# Patient Record
Sex: MALE | Race: WHITE | HISPANIC OR LATINO | URBAN - METROPOLITAN AREA
[De-identification: names, ages, dates, MRNs, and addresses within clinical notes are randomized per-mention and may not be internally consistent; named-entity substitution may affect disease eponyms.]

---

## 2017-01-06 ENCOUNTER — EMERGENCY (EMERGENCY)
Facility: HOSPITAL | Age: 57
LOS: 1 days | Discharge: HOME | End: 2017-01-06
Attending: EMERGENCY MEDICINE | Admitting: EMERGENCY MEDICINE
Payer: MEDICAID

## 2017-01-06 VITALS
TEMPERATURE: 98 F | DIASTOLIC BLOOD PRESSURE: 98 MMHG | OXYGEN SATURATION: 98 % | SYSTOLIC BLOOD PRESSURE: 164 MMHG | RESPIRATION RATE: 16 BRPM | HEART RATE: 86 BPM

## 2017-01-06 DIAGNOSIS — R20.0 ANESTHESIA OF SKIN: ICD-10-CM

## 2017-01-06 LAB
ALBUMIN SERPL ELPH-MCNC: 4.6 G/DL — SIGNIFICANT CHANGE UP (ref 3.3–5)
ALP SERPL-CCNC: 92 U/L — SIGNIFICANT CHANGE UP (ref 40–120)
ALT FLD-CCNC: 16 U/L RC — SIGNIFICANT CHANGE UP (ref 10–45)
ANION GAP SERPL CALC-SCNC: 15 MMOL/L — SIGNIFICANT CHANGE UP (ref 5–17)
APPEARANCE CSF: CLEAR — SIGNIFICANT CHANGE UP
APPEARANCE SPUN FLD: COLORLESS — SIGNIFICANT CHANGE UP
APPEARANCE UR: CLEAR — SIGNIFICANT CHANGE UP
APTT BLD: 30.5 SEC — SIGNIFICANT CHANGE UP (ref 27.5–37.4)
AST SERPL-CCNC: 19 U/L — SIGNIFICANT CHANGE UP (ref 10–40)
BASOPHILS # BLD AUTO: 0 K/UL — SIGNIFICANT CHANGE UP (ref 0–0.2)
BASOPHILS NFR BLD AUTO: 0.8 % — SIGNIFICANT CHANGE UP (ref 0–2)
BILIRUB SERPL-MCNC: 0.7 MG/DL — SIGNIFICANT CHANGE UP (ref 0.2–1.2)
BILIRUB UR-MCNC: NEGATIVE — SIGNIFICANT CHANGE UP
BLD GP AB SCN SERPL QL: NEGATIVE — SIGNIFICANT CHANGE UP
BUN SERPL-MCNC: 8 MG/DL — SIGNIFICANT CHANGE UP (ref 7–23)
CALCIUM SERPL-MCNC: 9.6 MG/DL — SIGNIFICANT CHANGE UP (ref 8.4–10.5)
CHLORIDE SERPL-SCNC: 106 MMOL/L — SIGNIFICANT CHANGE UP (ref 96–108)
CO2 SERPL-SCNC: 23 MMOL/L — SIGNIFICANT CHANGE UP (ref 22–31)
COLOR CSF: SIGNIFICANT CHANGE UP
COLOR SPEC: SIGNIFICANT CHANGE UP
CREAT SERPL-MCNC: 0.73 MG/DL — SIGNIFICANT CHANGE UP (ref 0.5–1.3)
DIFF PNL FLD: NEGATIVE — SIGNIFICANT CHANGE UP
EOSINOPHIL # BLD AUTO: 0 K/UL — SIGNIFICANT CHANGE UP (ref 0–0.5)
EOSINOPHIL NFR BLD AUTO: 0.7 % — SIGNIFICANT CHANGE UP (ref 0–6)
EPI CELLS # UR: SIGNIFICANT CHANGE UP /HPF
GLUCOSE CSF-MCNC: 57 MG/DL — SIGNIFICANT CHANGE UP (ref 40–70)
GLUCOSE SERPL-MCNC: 105 MG/DL — HIGH (ref 70–99)
GLUCOSE UR QL: NEGATIVE — SIGNIFICANT CHANGE UP
GRAM STN FLD: SIGNIFICANT CHANGE UP
HCT VFR BLD CALC: 37.1 % — LOW (ref 39–50)
HGB BLD-MCNC: 13.2 G/DL — SIGNIFICANT CHANGE UP (ref 13–17)
INR BLD: 1.15 RATIO — SIGNIFICANT CHANGE UP (ref 0.88–1.16)
KETONES UR-MCNC: NEGATIVE — SIGNIFICANT CHANGE UP
LEUKOCYTE ESTERASE UR-ACNC: NEGATIVE — SIGNIFICANT CHANGE UP
LYMPHOCYTES # BLD AUTO: 1.2 K/UL — SIGNIFICANT CHANGE UP (ref 1–3.3)
LYMPHOCYTES # BLD AUTO: 24.3 % — SIGNIFICANT CHANGE UP (ref 13–44)
MCHC RBC-ENTMCNC: 31.3 PG — SIGNIFICANT CHANGE UP (ref 27–34)
MCHC RBC-ENTMCNC: 35.4 GM/DL — SIGNIFICANT CHANGE UP (ref 32–36)
MCV RBC AUTO: 88.2 FL — SIGNIFICANT CHANGE UP (ref 80–100)
MONOCYTES # BLD AUTO: 0.4 K/UL — SIGNIFICANT CHANGE UP (ref 0–0.9)
MONOCYTES NFR BLD AUTO: 7.9 % — SIGNIFICANT CHANGE UP (ref 2–14)
NEUTROPHILS # BLD AUTO: 3.2 K/UL — SIGNIFICANT CHANGE UP (ref 1.8–7.4)
NEUTROPHILS # CSF: SIGNIFICANT CHANGE UP (ref 0–6)
NEUTROPHILS NFR BLD AUTO: 66.3 % — SIGNIFICANT CHANGE UP (ref 43–77)
NITRITE UR-MCNC: NEGATIVE — SIGNIFICANT CHANGE UP
NRBC NFR CSF: <1 — SIGNIFICANT CHANGE UP (ref 0–5)
PH UR: 7 — SIGNIFICANT CHANGE UP (ref 4.8–8)
PLATELET # BLD AUTO: 141 K/UL — LOW (ref 150–400)
POTASSIUM SERPL-MCNC: 3.5 MMOL/L — SIGNIFICANT CHANGE UP (ref 3.5–5.3)
POTASSIUM SERPL-SCNC: 3.5 MMOL/L — SIGNIFICANT CHANGE UP (ref 3.5–5.3)
PROT CSF-MCNC: 47 MG/DL — HIGH (ref 15–45)
PROT SERPL-MCNC: 7.6 G/DL — SIGNIFICANT CHANGE UP (ref 6–8.3)
PROT UR-MCNC: NEGATIVE — SIGNIFICANT CHANGE UP
PROTHROM AB SERPL-ACNC: 12.6 SEC — SIGNIFICANT CHANGE UP (ref 10–13.1)
RBC # BLD: 4.21 M/UL — SIGNIFICANT CHANGE UP (ref 4.2–5.8)
RBC # CSF: 1 /UL — HIGH (ref 0–0)
RBC # FLD: 12.2 % — SIGNIFICANT CHANGE UP (ref 10.3–14.5)
RBC CASTS # UR COMP ASSIST: SIGNIFICANT CHANGE UP /HPF (ref 0–2)
RH IG SCN BLD-IMP: POSITIVE — SIGNIFICANT CHANGE UP
SODIUM SERPL-SCNC: 144 MMOL/L — SIGNIFICANT CHANGE UP (ref 135–145)
SP GR SPEC: 1.01 — SIGNIFICANT CHANGE UP (ref 1.01–1.02)
SPECIMEN SOURCE: SIGNIFICANT CHANGE UP
TUBE TYPE: SIGNIFICANT CHANGE UP
UROBILINOGEN FLD QL: NEGATIVE — SIGNIFICANT CHANGE UP
WBC # BLD: 4.8 K/UL — SIGNIFICANT CHANGE UP (ref 3.8–10.5)
WBC # FLD AUTO: 4.8 K/UL — SIGNIFICANT CHANGE UP (ref 3.8–10.5)
WBC UR QL: SIGNIFICANT CHANGE UP /HPF (ref 0–5)

## 2017-01-06 PROCEDURE — 93010 ELECTROCARDIOGRAM REPORT: CPT | Mod: 59

## 2017-01-06 PROCEDURE — 70498 CT ANGIOGRAPHY NECK: CPT | Mod: 26

## 2017-01-06 PROCEDURE — 71010: CPT | Mod: 26

## 2017-01-06 PROCEDURE — 99220: CPT | Mod: 25

## 2017-01-06 PROCEDURE — 70496 CT ANGIOGRAPHY HEAD: CPT | Mod: 26

## 2017-01-06 PROCEDURE — 62270 DX LMBR SPI PNXR: CPT

## 2017-01-06 RX ORDER — ASPIRIN/CALCIUM CARB/MAGNESIUM 324 MG
1 TABLET ORAL
Qty: 0 | Refills: 0 | COMMUNITY

## 2017-01-06 RX ORDER — ATORVASTATIN CALCIUM 80 MG/1
1 TABLET, FILM COATED ORAL
Qty: 0 | Refills: 0 | COMMUNITY

## 2017-01-06 RX ORDER — ATORVASTATIN CALCIUM 80 MG/1
20 TABLET, FILM COATED ORAL AT BEDTIME
Qty: 0 | Refills: 0 | Status: DISCONTINUED | OUTPATIENT
Start: 2017-01-06 | End: 2017-01-10

## 2017-01-06 RX ORDER — AMLODIPINE BESYLATE 2.5 MG/1
10 TABLET ORAL DAILY
Qty: 0 | Refills: 0 | Status: DISCONTINUED | OUTPATIENT
Start: 2017-01-06 | End: 2017-01-10

## 2017-01-06 RX ORDER — ASPIRIN/CALCIUM CARB/MAGNESIUM 324 MG
81 TABLET ORAL DAILY
Qty: 0 | Refills: 0 | Status: DISCONTINUED | OUTPATIENT
Start: 2017-01-06 | End: 2017-01-10

## 2017-01-06 RX ORDER — METOPROLOL TARTRATE 50 MG
25 TABLET ORAL ONCE
Qty: 0 | Refills: 0 | Status: COMPLETED | OUTPATIENT
Start: 2017-01-06 | End: 2017-01-06

## 2017-01-06 RX ORDER — CLOPIDOGREL BISULFATE 75 MG/1
1 TABLET, FILM COATED ORAL
Qty: 0 | Refills: 0 | COMMUNITY

## 2017-01-06 RX ORDER — NEBIVOLOL HYDROCHLORIDE 5 MG/1
1 TABLET ORAL
Qty: 0 | Refills: 0 | COMMUNITY

## 2017-01-06 RX ORDER — LISINOPRIL 2.5 MG/1
1 TABLET ORAL
Qty: 0 | Refills: 0 | COMMUNITY

## 2017-01-06 RX ORDER — LISINOPRIL 2.5 MG/1
40 TABLET ORAL DAILY
Qty: 0 | Refills: 0 | Status: DISCONTINUED | OUTPATIENT
Start: 2017-01-06 | End: 2017-01-10

## 2017-01-06 RX ORDER — SODIUM CHLORIDE 9 MG/ML
1000 INJECTION INTRAMUSCULAR; INTRAVENOUS; SUBCUTANEOUS ONCE
Qty: 0 | Refills: 0 | Status: COMPLETED | OUTPATIENT
Start: 2017-01-06 | End: 2017-01-06

## 2017-01-06 RX ORDER — CLOPIDOGREL BISULFATE 75 MG/1
75 TABLET, FILM COATED ORAL DAILY
Qty: 0 | Refills: 0 | Status: DISCONTINUED | OUTPATIENT
Start: 2017-01-06 | End: 2017-01-10

## 2017-01-06 RX ORDER — AMLODIPINE BESYLATE 2.5 MG/1
1 TABLET ORAL
Qty: 0 | Refills: 0 | COMMUNITY

## 2017-01-06 RX ADMIN — Medication 25 MILLIGRAM(S): at 21:46

## 2017-01-06 RX ADMIN — SODIUM CHLORIDE 1000 MILLILITER(S): 9 INJECTION INTRAMUSCULAR; INTRAVENOUS; SUBCUTANEOUS at 20:04

## 2017-01-06 RX ADMIN — ATORVASTATIN CALCIUM 20 MILLIGRAM(S): 80 TABLET, FILM COATED ORAL at 21:46

## 2017-01-06 NOTE — ED ADULT NURSE NOTE - CHPI ED SYMPTOMS NEG
no weakness/no vomiting/no confusion/no dizziness/no fever/no diaphoresis/no blurred vision/no loss of consciousness/no lethargy/no urinary frequency/no nausea

## 2017-01-06 NOTE — ED CDU PROVIDER NOTE - MEDICAL DECISION MAKING DETAILS
see hpi ATTD: facial numbness with new aneurysm on ct outpt. repeat ct head done with right MCA aneurysm. will transfer to obs for neurovascular checks, MRI and neuro to follow.

## 2017-01-06 NOTE — ED CDU PROVIDER NOTE - PROGRESS NOTE DETAILS
Pt resting comfortable. No complaints. VSS. Will continue to observe and reassess in am -PA Juan Diego Kasper Pt sleeping. NAD. No complaints. VSS. Continue to monitor. -RASHID Kasper CDU NOTE RASHID MADSEN: Pt resting comfortably, feeling well without complaint. NAD, VSS. No events on telemetry. CV: S1S2 RR, Lungs CTA B/L, neuro exam intact no deficits. pt to get MRI brain today. CDU NOTE RASHID MADSEN: Pt resting comfortably, feeling well without complaint. NAD, VSS. No events on telemetry. spoke with neuro who gave verbal read on MRI - no new infarcts; pt stable for d/c. spoke with neurosx who prefer to wait for final read then will re-eval. called neuro radiologist for read, will give read shortly. CDU NOTE RASHID MADSEN: Pt resting comfortably, feeling well without complaint. NAD, VSS. No events on telemetry. spoke with neuro who gave verbal read on MRI - no new infarcts; pt stable for d/c with outpt f/u. per neuro no gabapentin now, can be started by outpt neuro if needed. spoke with neurosx who prefer to wait for final read then will re-eval. called neuro radiologist for read, will give read shortly. CDU NOTE RASHID MADSEN: case and plan discussed with Dr. ERIN Maravilla; pt stable for discharge. Taiwo: The patient has been seen and evaluated and reports improvement in symptoms.  The patient is stable for discharge home and will follow up with PMD.

## 2017-01-06 NOTE — ED PROVIDER NOTE - ATTENDING CONTRIBUTION TO CARE
55 y/o m with pmhx HTN, ischemic stroke x 3 in Dec. 2014 with mild left hemiparesis residual deficits on plavix and aspirin s/p rehab complaining of abnormal CT scan.  Daughter with him at bedside states that he has increasing numbness on left side of face since stroke. has a copy of the CT ordered by Dr. 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 No new weakness or numbness. no urinary complaints. PMD Dr. Brisa Jung  Gen. no acute distress, Non toxic   HEENT: EOMI, mmm,   Lungs: CTAB/L no C/ W /R   CVS: S1S2   Abd; Soft non tender, non distended   Ext: no edema   Neuro AAOx3 non focal clear speech  babinksi down on right up on left side. muscle strength 5/5 upper and lower b/l.  able to puff out cheeks and symetric smile. diminished sens on left side of face.

## 2017-01-06 NOTE — ED CDU PROVIDER NOTE - CONDUCTED A DETAILED DISCUSSION WITH PATIENT AND/OR GUARDIAN REGARDING, MDM
radiology results/lab results radiology results/need for outpatient follow-up/lab results/return to ED if symptoms worsen, persist or questions arise

## 2017-01-06 NOTE — ED PROVIDER NOTE - OBJECTIVE STATEMENT
56y Male PMH ischemic stroke with mild left hemiparesis residual deficits on plavix and aspirin s/p rehab complaining of abnormal CT scan.  Felt the left side of his face is weaker, having occasional headaches, therefore had CT scan of his head. Never had surgery. No nausea, no pain anywhere. Patient is near sighted, uses glasses, but no changes.     Neurologist: Dr. Nimisha Nevarez (Neurologist)  PCP: Dr. Brisa Jung 56y Male PMH HTN, ischemic stroke with mild left hemiparesis residual deficits on plavix and aspirin s/p rehab complaining of abnormal CT scan.  Felt the left side of his face is weaker, having occasional headaches, therefore had CT scan of his head. Never had surgery. No nausea, no pain anywhere. Patient is near sighted, uses glasses, but no changes.     Neurologist: Dr. Nimisha Nevarez (Neurologist)  PCP: Dr. Brisa Jung 56y Male PMH HTN, ischemic stroke with mild left hemiparesis residual deficits on plavix and aspirin s/p rehab complaining of abnormal CT scan.  Felt the left side of his face is number / weaker, though persistent from stroke, having occasional headaches, therefore had CT Angio scan of his head by his new neurologist. Recent CT angio demonstrating 4.5mm aneurysm of head in right distal M1 middle cerebral arteries, severe stenosis of right distal vertebral artery. Never had surgery previously. No nausea, no pain anywhere. Patient is near sighted, uses glasses, but no changes.     Neurologist: Dr. Nimisha Nevarez (Neurologist)  PCP: Dr. Brisa Jung

## 2017-01-06 NOTE — ED CDU PROVIDER NOTE - OBJECTIVE STATEMENT
56yom pmhx of HTN HLD ischemic stroke with mild left hemiparesis residual deficits on plavix and aspirin s/p rehab complaining of abnormal CT scan. Pt started follow up with new neurologist and expressed symx of intermittent L sided facial numbness and headache for years therefore had CT scan of his head. No new symx. Never had surgery. No nausea, no pain anywhere. Patient is near sighted, uses glasses, but no changes. CT head and CTA with small aneurysm. In CDU for MRI for better eval of the aneurysm and NeuroSx re-eval.     Neurologist: Dr. Nimisha Nevarez (Neurologist)  PCP: Dr. Brisa Jung

## 2017-01-06 NOTE — ED PROVIDER NOTE - PROGRESS NOTE DETAILS
seen by neuro and neurosurgery ,LP done no xanthochromia ZR seen by neuro and neurosurgery ,LP done no xanthochromia pt has facial numbness for 4 y after his stroke follow up mri and neuro outpatient  ZR seen by neuro and neurosurgery ,LP done no xanthochromia pt has facial numbness for 4 y after his stroke  Neuro surgery wants to cdu pt ZR seen by neuro and neurosurgery ,LP done no xanthochromia pt has facial numbness for 4 y after his stroke  Neuro surgery wants to cdu pt with MRI tomorrow ZR

## 2017-01-06 NOTE — ED CDU PROVIDER NOTE - PLAN OF CARE
1. Follow up with your doctor/ Neurologist. And Neurosurgeon. Take copy of your results.   2. Rest, keep self well hydrated. Continue home medications as prescribed, aneurysm education packet was given to you for further information  3. Return for any weakness, numbness, change in speech or vision, problems walking or any other concerns 1. Follow up with your PMD, Neurologist ([phone]), and Neurosurgeon, Dr. Louie ([phone]) within 1 week. Take copy of your results.   2. Rest, keep self well hydrated. Continue home medications as prescribed, aneurysm education packet was given to you for further information  3. Return for any weakness, numbness, change in speech or vision, problems walking or any other concerns

## 2017-01-06 NOTE — ED ADULT NURSE NOTE - OBJECTIVE STATEMENT
57 y/o male aox3 c/o "headache x2 weeks on and off". Denies any pain at this time. No blurry vision, no nausea, vomiting or dizziness. Hx of stroke in the past with left sided residual weakness. No shortness of breath. Skin is warm dry pink intact. Vitals stable. pt is ambulatory with steady gait. IV in place no signs of infiltration.

## 2017-01-06 NOTE — ED CDU PROVIDER NOTE - ATTENDING CONTRIBUTION TO CARE
see hpi 57 y/o m with pmhx HTN, ischemic stroke x 3 in Dec. 2014 with mild left hemiparesis residual deficits on plavix and aspirin s/p rehab complaining of abnormal CT scan.  Daughter with him at bedside states that he has increasing numbness on left side of face since stroke. has a copy of the CT ordered by Dr. 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 No new weakness or numbness. no urinary complaints. PMD Dr. Brisa Jung  Gen. no acute distress, Non toxic   HEENT: EOMI, mmm,   Lungs: CTAB/L no C/ W /R   CVS: S1S2   Abd; Soft non tender, non distended   Ext: no edema   Neuro AAOx3 non focal clear speech  babinksi down on right up on left side. muscle strength 5/5 upper and lower b/l.  able to puff out cheeks and symetric smile. diminished sens on left side of face.

## 2017-01-07 VITALS
OXYGEN SATURATION: 97 % | TEMPERATURE: 98 F | SYSTOLIC BLOOD PRESSURE: 127 MMHG | DIASTOLIC BLOOD PRESSURE: 76 MMHG | RESPIRATION RATE: 18 BRPM | HEART RATE: 61 BPM

## 2017-01-07 PROCEDURE — 70553 MRI BRAIN STEM W/O & W/DYE: CPT | Mod: 26

## 2017-01-07 PROCEDURE — 71045 X-RAY EXAM CHEST 1 VIEW: CPT

## 2017-01-07 PROCEDURE — 80053 COMPREHEN METABOLIC PANEL: CPT

## 2017-01-07 PROCEDURE — 70553 MRI BRAIN STEM W/O & W/DYE: CPT

## 2017-01-07 PROCEDURE — 89051 BODY FLUID CELL COUNT: CPT

## 2017-01-07 PROCEDURE — 87070 CULTURE OTHR SPECIMN AEROBIC: CPT

## 2017-01-07 PROCEDURE — 85027 COMPLETE CBC AUTOMATED: CPT

## 2017-01-07 PROCEDURE — 84157 ASSAY OF PROTEIN OTHER: CPT

## 2017-01-07 PROCEDURE — 85610 PROTHROMBIN TIME: CPT

## 2017-01-07 PROCEDURE — G0378: CPT

## 2017-01-07 PROCEDURE — 82945 GLUCOSE OTHER FLUID: CPT

## 2017-01-07 PROCEDURE — A9585: CPT

## 2017-01-07 PROCEDURE — 70498 CT ANGIOGRAPHY NECK: CPT

## 2017-01-07 PROCEDURE — 99217: CPT

## 2017-01-07 PROCEDURE — 86900 BLOOD TYPING SEROLOGIC ABO: CPT

## 2017-01-07 PROCEDURE — 70496 CT ANGIOGRAPHY HEAD: CPT

## 2017-01-07 PROCEDURE — 86901 BLOOD TYPING SEROLOGIC RH(D): CPT

## 2017-01-07 PROCEDURE — 99285 EMERGENCY DEPT VISIT HI MDM: CPT | Mod: 25

## 2017-01-07 PROCEDURE — 86850 RBC ANTIBODY SCREEN: CPT

## 2017-01-07 PROCEDURE — 87205 SMEAR GRAM STAIN: CPT

## 2017-01-07 PROCEDURE — 85730 THROMBOPLASTIN TIME PARTIAL: CPT

## 2017-01-07 PROCEDURE — 99283 EMERGENCY DEPT VISIT LOW MDM: CPT

## 2017-01-07 PROCEDURE — 81001 URINALYSIS AUTO W/SCOPE: CPT

## 2017-01-07 PROCEDURE — 93005 ELECTROCARDIOGRAM TRACING: CPT

## 2017-01-07 PROCEDURE — 62270 DX LMBR SPI PNXR: CPT

## 2017-01-07 RX ADMIN — AMLODIPINE BESYLATE 10 MILLIGRAM(S): 2.5 TABLET ORAL at 06:08

## 2017-01-07 RX ADMIN — LISINOPRIL 40 MILLIGRAM(S): 2.5 TABLET ORAL at 06:08

## 2017-01-07 RX ADMIN — CLOPIDOGREL BISULFATE 75 MILLIGRAM(S): 75 TABLET, FILM COATED ORAL at 06:08

## 2017-01-07 RX ADMIN — Medication 81 MILLIGRAM(S): at 06:08

## 2017-01-07 NOTE — ED ADULT NURSE REASSESSMENT NOTE - NIH STROKE SCALE: 5A. MOTOR ARM, LEFT, QM
(0) No drift; limb holds 90 (or 45) degrees for full 10 secs

## 2017-01-07 NOTE — ED ADULT NURSE REASSESSMENT NOTE - NIH STROKE SCALE: 1B. LOC QUESTIONS, QM
(0) Answers both questions correctly

## 2017-01-07 NOTE — ED ADULT NURSE REASSESSMENT NOTE - NURSING NEURO ORIENTATION
oriented to person, place and time

## 2017-01-07 NOTE — ED ADULT NURSE REASSESSMENT NOTE - NIH STROKE SCALE: 5B. MOTOR ARM, RIGHT, QM
(0) No drift; limb holds 90 (or 45) degrees for full 10 secs
(0) No drift; limb holds 90 (or 45) degrees for full 10 secs

## 2017-01-07 NOTE — ED ADULT NURSE REASSESSMENT NOTE - NIH STROKE SCALE: 1C. LOC COMMANDS, QM
(0) Performs both tasks correctly

## 2017-01-07 NOTE — ED ADULT NURSE REASSESSMENT NOTE - NIH STROKE SCALE: 6A. MOTOR LEG, LEFT, QM
(0) No drift; leg holds 30 degree position for full 5 secs

## 2017-01-09 LAB
CULTURE RESULTS: NO GROWTH — SIGNIFICANT CHANGE UP
SPECIMEN SOURCE: SIGNIFICANT CHANGE UP

## 2018-09-05 PROBLEM — E78.00 PURE HYPERCHOLESTEROLEMIA, UNSPECIFIED: Chronic | Status: ACTIVE | Noted: 2017-01-06

## 2018-09-05 PROBLEM — Z00.00 ENCOUNTER FOR PREVENTIVE HEALTH EXAMINATION: Status: ACTIVE | Noted: 2018-09-05

## 2018-09-05 PROBLEM — I10 ESSENTIAL (PRIMARY) HYPERTENSION: Chronic | Status: ACTIVE | Noted: 2017-01-06

## 2018-09-05 PROBLEM — I63.9 CEREBRAL INFARCTION, UNSPECIFIED: Chronic | Status: ACTIVE | Noted: 2017-01-06

## 2018-12-19 ENCOUNTER — APPOINTMENT (OUTPATIENT)
Dept: NEUROLOGY | Facility: CLINIC | Age: 58
End: 2018-12-19

## 2018-12-26 ENCOUNTER — APPOINTMENT (OUTPATIENT)
Dept: NEUROLOGY | Facility: CLINIC | Age: 58
End: 2018-12-26

## 2019-01-16 ENCOUNTER — APPOINTMENT (OUTPATIENT)
Dept: NEUROLOGY | Facility: CLINIC | Age: 59
End: 2019-01-16

## 2020-07-06 NOTE — ED PROVIDER NOTE - QRS
Pharmacy sends a refill request for the following:  lisinopril (ZESTRIL) 20 MG tablet 90 tablet 3 7/10/2019     Sig - Route: Take 1 tablet by mouth daily. - Oral        And     fenofibrate 160 MG tablet 90 tablet 3 7/10/2019     Sig - Route: Take 1 tablet by mouth daily. - Oral      And     atorvastatin (LIPITOR) 40 MG tablet 90 tablet 3 7/10/2019     Sig - Route: Take 1 tablet by mouth nightly. - Oral        Last Appt:7/10/2019  Next Appt:8/10/2020    Ok to refill, per protocol.    100

## 2024-03-23 NOTE — ED ADULT NURSE REASSESSMENT NOTE - NS ED NURSE REASSESS COMMENT FT1
pt is a 56 yr M sent in by PMD for diagnosed aneurysm on CT after having L facial weakness. pt has hx of strokes with L sided residual. denies increased weakness at this time. denies slurred speech. pt a&ox3, follows commands, L arm noted to be weaker but as per pt and family this is baseline. no acute distress. awaiting CTA
Received awake and alert. Denies any pain/discomfort. All extremities strong and mobile. +strong hand grasp. Awaiting MRI of the head.
Teresa Laughlin(Attending)
Pt AO4, Neuro check intact. No deficits noted. Safety maintained will continue to monitor.
Pt AO4, neuro assessment intact. No deficits noted. Safety maintained will continue to monitor.
Pt received from [place] [facility]. Pt oriented to CDU & plan of care was discussed. Pt AO4, Neuro check intact.  Safety & comfort measures maintained. Call bell in reach. Will continue to monitor.